# Patient Record
Sex: FEMALE | Race: WHITE
[De-identification: names, ages, dates, MRNs, and addresses within clinical notes are randomized per-mention and may not be internally consistent; named-entity substitution may affect disease eponyms.]

---

## 2017-02-23 ENCOUNTER — HOSPITAL ENCOUNTER (INPATIENT)
Dept: HOSPITAL 13 - EME | Age: 52
LOS: 5 days | Discharge: HOME | DRG: 189 | End: 2017-02-28
Attending: INTERNAL MEDICINE | Admitting: INTERNAL MEDICINE
Payer: COMMERCIAL

## 2017-02-23 VITALS — HEIGHT: 67 IN | BODY MASS INDEX: 45.99 KG/M2 | WEIGHT: 293 LBS

## 2017-02-23 DIAGNOSIS — J44.9: ICD-10-CM

## 2017-02-23 DIAGNOSIS — Z79.84: ICD-10-CM

## 2017-02-23 DIAGNOSIS — I42.9: ICD-10-CM

## 2017-02-23 DIAGNOSIS — G47.33: ICD-10-CM

## 2017-02-23 DIAGNOSIS — Z79.4: ICD-10-CM

## 2017-02-23 DIAGNOSIS — F32.9: ICD-10-CM

## 2017-02-23 DIAGNOSIS — E11.42: ICD-10-CM

## 2017-02-23 DIAGNOSIS — Z87.891: ICD-10-CM

## 2017-02-23 DIAGNOSIS — I48.2: ICD-10-CM

## 2017-02-23 DIAGNOSIS — J96.01: Primary | ICD-10-CM

## 2017-02-23 DIAGNOSIS — E78.5: ICD-10-CM

## 2017-02-23 DIAGNOSIS — I50.33: ICD-10-CM

## 2017-02-23 DIAGNOSIS — J10.1: ICD-10-CM

## 2017-02-23 DIAGNOSIS — E66.9: ICD-10-CM

## 2017-02-23 DIAGNOSIS — I95.9: ICD-10-CM

## 2017-02-23 DIAGNOSIS — R91.8: ICD-10-CM

## 2017-02-23 DIAGNOSIS — I11.0: ICD-10-CM

## 2017-02-23 LAB
ADD MIUA?: YES
ANA TITR SER IF: (no result) /HPF (ref 0–5)
ANION GAP: 9 MEQ/L (ref 2–14)
BACTERIA #/AREA URNS HPF: (no result) /HPF
BILIRUBIN: NEGATIVE
BLOOD: NEGATIVE
CARBAMAZEPINE SERPL-MCNC: < 0.01 NG/ML (ref 0–0.3)
CHLORIDE: 100 MEQ/L (ref 99–109)
COLOR UR: YELLOW
CREATINE KINASE: 54 IU/L (ref 1–294)
D DIMER PPP FEU-MCNC: NEGATIVE
EOSINOPHIL # BLD: 0.2 K/UL (ref 0–0.3)
EOSINOPHIL (%): 2.8 % (ref 0–5)
EPITHELIAL CELLS: (no result) /HPF
ETHANOL SERPL-MCNC: NEGATIVE MG/DL
GFR SERPLBLD CREATININE-BSD FMLAMALE: > 59 ML/MIN/
GLUCOSE (STRIP): NEGATIVE
GLUCOSE SERPL-MCNC: 218 MG/DL (ref 70–99)
HCO3 BLD-SCNC: 28 MEQ/L (ref 20–31)
HCT VFR BLD CALC: 42.1 % (ref 36–46)
HYALINE CASTS #/AREA URNS LPF: (no result) /LPF
IMM GRANULOCYTES # BLD: 0.1 K/UL
IMMATURE GRANULOCYTE (%): 0.1 % (ref 0–0.7)
INFLUENZA A VIRAL ANTIGEN: (no result)
INFLUENZA B VIRAL ANTIGEN: (no result)
IRON SERPL-MCNC: NEGATIVE UG/DL
LYMPHOCYTE COUNT: 1.4 K/UL (ref 1–2.8)
MCH RBC QN AUTO: 30.5 PG (ref 29–34)
MCV: 90.5 FL (ref 83–99)
MONOCYTE (%): 13.9 % (ref 3–12)
MONOCYTES # BLD MANUAL: 33.7 G/DL (ref 30–36)
MONOCYTES # BLD: 0.9 K/UL (ref 0–0.8)
MUCOUS THREADS #/AREA URNS LPF: (no result) /LPF
NEUTROPHIL (%): 61.8 % (ref 45–76)
NEUTROPHIL COUNT: 4.2 K/UL (ref 1.8–6.4)
NITRITE: NEGATIVE
PAPPENHEIMER BOD BLD QL SMEAR: 0.1 K/UL (ref 0–0.1)
PLATELET COUNT: 333 K/UL (ref 156–360)
POTASSIUM SERPL-SCNC: 4 MEQ/L (ref 3.7–5.4)
RBC # BLD AUTO: 4.65 M/UL (ref 3.8–5.2)
RBC DIS.WIDTH-CV: 13.7 % (ref 11.8–14.6)
RBC DIS.WIDTH-SD: 44.2 % (ref 39–53)
RED BLOOD CELLS: (no result) /HPF (ref 0–5)
SODIUM: 137 MEQ/L (ref 136–147)
SPECIFIC GRAVITY: 1.02 (ref 1–1.03)
UREA NITROGEN (BUN): 16 MG/DL (ref 9–23)
UROBILINOGEN UR-MCNC: 0.2 MG/DL (ref 0.2–1)
VARIANT LYMPHS NFR BLD MANUAL: 9.7 UM^3 (ref 9.5–12.4)
WBC # BLD AUTO: 6.8 K/UL (ref 4.1–10.2)
WBC # BLD AUTO: NEGATIVE 10*3/UL

## 2017-02-24 VITALS — DIASTOLIC BLOOD PRESSURE: 62 MMHG | SYSTOLIC BLOOD PRESSURE: 88 MMHG

## 2017-02-24 VITALS — SYSTOLIC BLOOD PRESSURE: 105 MMHG | DIASTOLIC BLOOD PRESSURE: 68 MMHG

## 2017-02-24 VITALS — SYSTOLIC BLOOD PRESSURE: 104 MMHG | DIASTOLIC BLOOD PRESSURE: 59 MMHG

## 2017-02-24 VITALS — DIASTOLIC BLOOD PRESSURE: 57 MMHG | SYSTOLIC BLOOD PRESSURE: 112 MMHG

## 2017-02-24 VITALS — SYSTOLIC BLOOD PRESSURE: 159 MMHG | DIASTOLIC BLOOD PRESSURE: 74 MMHG

## 2017-02-24 VITALS — SYSTOLIC BLOOD PRESSURE: 102 MMHG | DIASTOLIC BLOOD PRESSURE: 61 MMHG

## 2017-02-24 LAB
ALP SERPL-CCNC: 85 IU/L (ref 3–129)
ANION GAP: 11 MEQ/L (ref 2–14)
CARBAMAZEPINE SERPL-MCNC: < 0.01 NG/ML (ref 0–0.3)
CARBAMAZEPINE SERPL-MCNC: < 0.01 NG/ML (ref 0–0.3)
CHLORIDE: 102 MEQ/L (ref 99–109)
ETHANOL SERPL-MCNC: NEGATIVE MG/DL
ETHANOL SERPL-MCNC: NEGATIVE MG/DL
GFR SERPLBLD CREATININE-BSD FMLAMALE: > 59 ML/MIN/
GLUCOSE SERPL-MCNC: 179 MG/DL (ref 70–99)
HCO3 BLD-SCNC: 25 MEQ/L (ref 20–31)
HCT VFR BLD CALC: 40.4 % (ref 36–46)
MCH RBC QN AUTO: 30.4 PG (ref 29–34)
MCV: 90.2 FL (ref 83–99)
MONOCYTES # BLD MANUAL: 33.7 G/DL (ref 30–36)
PLATELET COUNT: 267 K/UL (ref 156–360)
POINT-OF-CARE METER ID: (no result)
POTASSIUM SERPL-SCNC: 3.7 MEQ/L (ref 3.7–5.4)
RBC # BLD AUTO: 4.48 M/UL (ref 3.8–5.2)
RBC DIS.WIDTH-CV: 13.6 % (ref 11.8–14.6)
RBC DIS.WIDTH-SD: 44 % (ref 39–53)
SODIUM: 138 MEQ/L (ref 136–147)
TOTAL BILIRUBIN: 0.4 MG/DL (ref 0–1)
UREA NITROGEN (BUN): 17 MG/DL (ref 9–23)
VARIANT LYMPHS NFR BLD MANUAL: 10.1 UM^3 (ref 9.5–12.4)
WBC # BLD AUTO: 6.4 K/UL (ref 4.1–10.2)

## 2017-02-25 VITALS — SYSTOLIC BLOOD PRESSURE: 119 MMHG | DIASTOLIC BLOOD PRESSURE: 58 MMHG

## 2017-02-25 VITALS — DIASTOLIC BLOOD PRESSURE: 62 MMHG | SYSTOLIC BLOOD PRESSURE: 100 MMHG

## 2017-02-25 VITALS — DIASTOLIC BLOOD PRESSURE: 60 MMHG | SYSTOLIC BLOOD PRESSURE: 141 MMHG

## 2017-02-25 VITALS — SYSTOLIC BLOOD PRESSURE: 98 MMHG | DIASTOLIC BLOOD PRESSURE: 60 MMHG

## 2017-02-25 VITALS — DIASTOLIC BLOOD PRESSURE: 64 MMHG | SYSTOLIC BLOOD PRESSURE: 118 MMHG

## 2017-02-25 VITALS — DIASTOLIC BLOOD PRESSURE: 52 MMHG | SYSTOLIC BLOOD PRESSURE: 98 MMHG

## 2017-02-25 LAB
ANION GAP: 9 MEQ/L (ref 2–14)
CHLORIDE: 102 MEQ/L (ref 99–109)
EOSINOPHIL # BLD: 0.5 K/UL (ref 0–0.3)
EOSINOPHIL (%): 8.2 % (ref 0–5)
GFR SERPLBLD CREATININE-BSD FMLAMALE: > 59 ML/MIN/
GLUCOSE SERPL-MCNC: 149 MG/DL (ref 70–99)
HCO3 BLD-SCNC: 30 MEQ/L (ref 20–31)
HCT VFR BLD CALC: 41.6 % (ref 36–46)
IMM GRANULOCYTES # BLD: 0 K/UL
IMMATURE GRANULOCYTE (%): 0.2 % (ref 0–0.7)
INFLUENZA A VIRAL ANTIGEN: NEGATIVE
INFLUENZA B VIRAL ANTIGEN: POSITIVE
LYMPHOCYTE COUNT: 1.5 K/UL (ref 1–2.8)
MCH RBC QN AUTO: 31 PG (ref 29–34)
MCV: 92.7 FL (ref 83–99)
MONOCYTE (%): 11.4 % (ref 3–12)
MONOCYTES # BLD MANUAL: 33.4 G/DL (ref 30–36)
MONOCYTES # BLD: 0.6 K/UL (ref 0–0.8)
NEUTROPHIL (%): 52.7 % (ref 45–76)
NEUTROPHIL COUNT: 3 K/UL (ref 1.8–6.4)
NRBC (%): 0 /100 WBC (ref 0–0)
PAPPENHEIMER BOD BLD QL SMEAR: 0 K/UL (ref 0–0.1)
PLATELET COUNT: 264 K/UL (ref 156–360)
POINT-OF-CARE METER ID: (no result)
POTASSIUM SERPL-SCNC: 4.2 MEQ/L (ref 3.7–5.4)
RBC # BLD AUTO: 4.49 M/UL (ref 3.8–5.2)
RBC DIS.WIDTH-CV: 13.8 % (ref 11.8–14.6)
RBC DIS.WIDTH-SD: 46.3 % (ref 39–53)
SAMPLE HEMOLYSIS CHECK: 0
SAMPLE ICTERIC CHECK: 0
SODIUM: 141 MEQ/L (ref 136–147)
SPECIMEN VOL UR: 0 ML
UREA NITROGEN (BUN): 17 MG/DL (ref 9–23)
VARIANT LYMPHS NFR BLD MANUAL: 10.2 UM^3 (ref 9.5–12.4)
WBC # BLD AUTO: 5.6 K/UL (ref 4.1–10.2)

## 2017-02-26 VITALS — SYSTOLIC BLOOD PRESSURE: 122 MMHG | DIASTOLIC BLOOD PRESSURE: 66 MMHG

## 2017-02-26 VITALS — DIASTOLIC BLOOD PRESSURE: 59 MMHG | SYSTOLIC BLOOD PRESSURE: 110 MMHG

## 2017-02-26 VITALS — DIASTOLIC BLOOD PRESSURE: 60 MMHG | SYSTOLIC BLOOD PRESSURE: 110 MMHG

## 2017-02-26 VITALS — SYSTOLIC BLOOD PRESSURE: 112 MMHG | DIASTOLIC BLOOD PRESSURE: 60 MMHG

## 2017-02-26 VITALS — DIASTOLIC BLOOD PRESSURE: 62 MMHG | SYSTOLIC BLOOD PRESSURE: 106 MMHG

## 2017-02-26 VITALS — SYSTOLIC BLOOD PRESSURE: 120 MMHG | DIASTOLIC BLOOD PRESSURE: 68 MMHG

## 2017-02-26 LAB
25(OH)D3 SERPL-MCNC: 1.7 MG/DL (ref 1.3–2.7)
ANION GAP: 7 MEQ/L (ref 2–14)
CHLORIDE: 102 MEQ/L (ref 99–109)
GFR SERPLBLD CREATININE-BSD FMLAMALE: > 59 ML/MIN/
GLUCOSE SERPL-MCNC: 265 MG/DL (ref 70–99)
HCO3 BLD-SCNC: 30 MEQ/L (ref 20–31)
HCT VFR BLD CALC: 41.6 % (ref 36–46)
MCH RBC QN AUTO: 29.8 PG (ref 29–34)
MCV: 91.8 FL (ref 83–99)
MONOCYTES # BLD MANUAL: 32.5 G/DL (ref 30–36)
PLATELET COUNT: 270 K/UL (ref 156–360)
POINT-OF-CARE METER ID: (no result)
POTASSIUM SERPL-SCNC: 4.1 MEQ/L (ref 3.7–5.4)
RBC # BLD AUTO: 4.53 M/UL (ref 3.8–5.2)
RBC DIS.WIDTH-CV: 13.6 % (ref 11.8–14.6)
RBC DIS.WIDTH-SD: 45.2 % (ref 39–53)
SAMPLE HEMOLYSIS CHECK: 0
SAMPLE ICTERIC CHECK: 0
SODIUM: 139 MEQ/L (ref 136–147)
SPECIMEN VOL UR: 0 ML
UREA NITROGEN (BUN): 20 MG/DL (ref 9–23)
VARIANT LYMPHS NFR BLD MANUAL: 10.2 UM^3 (ref 9.5–12.4)
WBC # BLD AUTO: 4.6 K/UL (ref 4.1–10.2)

## 2017-02-27 VITALS — SYSTOLIC BLOOD PRESSURE: 117 MMHG | DIASTOLIC BLOOD PRESSURE: 72 MMHG

## 2017-02-27 VITALS — SYSTOLIC BLOOD PRESSURE: 93 MMHG | DIASTOLIC BLOOD PRESSURE: 54 MMHG

## 2017-02-27 VITALS — SYSTOLIC BLOOD PRESSURE: 118 MMHG | DIASTOLIC BLOOD PRESSURE: 53 MMHG

## 2017-02-27 VITALS — SYSTOLIC BLOOD PRESSURE: 111 MMHG | DIASTOLIC BLOOD PRESSURE: 55 MMHG

## 2017-02-27 VITALS — DIASTOLIC BLOOD PRESSURE: 82 MMHG | SYSTOLIC BLOOD PRESSURE: 124 MMHG

## 2017-02-27 VITALS — DIASTOLIC BLOOD PRESSURE: 71 MMHG | SYSTOLIC BLOOD PRESSURE: 135 MMHG

## 2017-02-27 VITALS — DIASTOLIC BLOOD PRESSURE: 62 MMHG | SYSTOLIC BLOOD PRESSURE: 117 MMHG

## 2017-02-27 LAB
ANION GAP: 9 MEQ/L (ref 2–14)
CHLORIDE: 103 MEQ/L (ref 99–109)
GFR SERPLBLD CREATININE-BSD FMLAMALE: > 59 ML/MIN/
GLUCOSE SERPL-MCNC: 147 MG/DL (ref 70–99)
HCO3 BLD-SCNC: 32 MEQ/L (ref 20–31)
POINT-OF-CARE METER ID: (no result)
POTASSIUM SERPL-SCNC: 3.3 MEQ/L (ref 3.7–5.4)
SAMPLE HEMOLYSIS CHECK: 0
SAMPLE ICTERIC CHECK: 0
SODIUM: 144 MEQ/L (ref 136–147)
SPECIMEN VOL UR: 0 ML
UREA NITROGEN (BUN): 21 MG/DL (ref 9–23)

## 2017-02-28 VITALS — DIASTOLIC BLOOD PRESSURE: 68 MMHG | SYSTOLIC BLOOD PRESSURE: 119 MMHG

## 2017-02-28 VITALS — SYSTOLIC BLOOD PRESSURE: 121 MMHG | DIASTOLIC BLOOD PRESSURE: 78 MMHG

## 2017-02-28 VITALS — DIASTOLIC BLOOD PRESSURE: 66 MMHG | SYSTOLIC BLOOD PRESSURE: 125 MMHG

## 2017-02-28 LAB
POINT-OF-CARE METER ID: (no result)
POINT-OF-CARE METER ID: (no result)

## 2019-05-28 ENCOUNTER — APPOINTMENT (OUTPATIENT)
Age: 54
Setting detail: DERMATOLOGY
End: 2019-05-28

## 2019-05-28 DIAGNOSIS — B35.4 TINEA CORPORIS: ICD-10-CM

## 2019-05-28 DIAGNOSIS — L0391 CELLULITIS AND ABSCESS OF UNSPECIFIED SITES: ICD-10-CM

## 2019-05-28 DIAGNOSIS — L0390 CELLULITIS AND ABSCESS OF UNSPECIFIED SITES: ICD-10-CM

## 2019-05-28 PROBLEM — L02.212 CUTANEOUS ABSCESS OF BACK [ANY PART, EXCEPT BUTTOCK]: Status: ACTIVE | Noted: 2019-05-28

## 2019-05-28 PROCEDURE — OTHER PRESCRIPTION: OTHER

## 2019-05-28 PROCEDURE — OTHER MIPS QUALITY: OTHER

## 2019-05-28 PROCEDURE — 99202 OFFICE O/P NEW SF 15 MIN: CPT

## 2019-05-28 RX ORDER — KETOCONAZOLE 20 MG/G
2% CREAM TOPICAL BID
Qty: 1 | Refills: 3 | Status: ERX | COMMUNITY
Start: 2019-05-28

## 2019-05-28 RX ORDER — CLARITHROMYCIN 500 MG/1
1 TABLET, COATED ORAL BID
Qty: 20 | Refills: 1 | Status: ERX | COMMUNITY
Start: 2019-05-28

## 2019-05-28 ASSESSMENT — LOCATION ZONE DERM
LOCATION ZONE: LEG
LOCATION ZONE: TRUNK

## 2019-05-28 ASSESSMENT — LOCATION DETAILED DESCRIPTION DERM
LOCATION DETAILED: RIGHT POPLITEAL SKIN
LOCATION DETAILED: SUPERIOR THORACIC SPINE

## 2019-05-28 ASSESSMENT — LOCATION SIMPLE DESCRIPTION DERM
LOCATION SIMPLE: UPPER BACK
LOCATION SIMPLE: RIGHT POPLITEAL SKIN

## 2019-05-28 NOTE — PROCEDURE: MIPS QUALITY
Detail Level: Detailed
Quality 110: Preventive Care And Screening: Influenza Immunization: Influenza Immunization Administered during Influenza season
Quality 402: Tobacco Use And Help With Quitting Among Adolescents: Patient screened for tobacco and is an ex-smoker
Quality 111:Pneumonia Vaccination Status For Older Adults: Pneumococcal Vaccination not Administered or Previously Received, Reason not Otherwise Specified
Quality 130: Documentation Of Current Medications In The Medical Record: Current Medications Documented
Quality 431: Preventive Care And Screening: Unhealthy Alcohol Use - Screening: Documentation of medical reason(s) for not screening for unhealthy alcohol use (eg, limited life expectancy, other medical reasons)
Quality 131: Pain Assessment And Follow-Up: Pain assessment documented as positive using a standardized tool AND a follow-up plan is documented
Quality 226: Preventive Care And Screening: Tobacco Use: Screening And Cessation Intervention: Patient screened for tobacco use and is an ex/non-smoker

## 2020-11-18 NOTE — HPI: SKIN LESIONS
----- Message from Cheryle Quintana sent at 11/18/2020 11:52 AM CST -----  Called pt to schedule with Dr Norwood.     
How Severe Is Your Skin Lesion?: mild
Have Your Skin Lesions Been Treated?: not been treated
Is This A New Presentation, Or A Follow-Up?: Skin Lesions

## 2022-11-02 ENCOUNTER — APPOINTMENT (OUTPATIENT)
Age: 57
Setting detail: DERMATOLOGY
End: 2022-11-02

## 2022-11-02 DIAGNOSIS — B35.1 TINEA UNGUIUM: ICD-10-CM

## 2022-11-02 DIAGNOSIS — L21.8 OTHER SEBORRHEIC DERMATITIS: ICD-10-CM

## 2022-11-02 DIAGNOSIS — L71.8 OTHER ROSACEA: ICD-10-CM

## 2022-11-02 PROCEDURE — 99204 OFFICE O/P NEW MOD 45 MIN: CPT

## 2022-11-02 PROCEDURE — OTHER PRESCRIPTION: OTHER

## 2022-11-02 PROCEDURE — OTHER MIPS QUALITY: OTHER

## 2022-11-02 PROCEDURE — OTHER COUNSELING: OTHER

## 2022-11-02 RX ORDER — METRONIDAZOLE 7.5 MG/G
0.75% GEL TOPICAL QD
Qty: 45 | Refills: 2 | Status: ERX | COMMUNITY
Start: 2022-11-02

## 2022-11-02 RX ORDER — KETOCONAZOLE 20 MG/G
2% CREAM TOPICAL BID
Qty: 15 | Refills: 1 | Status: ERX | COMMUNITY
Start: 2022-11-02

## 2022-11-02 RX ORDER — CICLOPIROX 80 MG/ML
8% SOLUTION TOPICAL QHS
Qty: 6.6 | Refills: 3 | Status: ERX | COMMUNITY
Start: 2022-11-02

## 2022-11-02 ASSESSMENT — LOCATION SIMPLE DESCRIPTION DERM
LOCATION SIMPLE: RIGHT CHEEK
LOCATION SIMPLE: LEFT CHEEK
LOCATION SIMPLE: RIGHT INDEX FINGERNAIL
LOCATION SIMPLE: GLABELLA
LOCATION SIMPLE: RIGHT INDEX FINGERNAIL
LOCATION SIMPLE: LEFT CHEEK
LOCATION SIMPLE: LEFT THUMBNAIL
LOCATION SIMPLE: LEFT FOREHEAD
LOCATION SIMPLE: LEFT FOREHEAD
LOCATION SIMPLE: INFERIOR FOREHEAD
LOCATION SIMPLE: RIGHT CHEEK
LOCATION SIMPLE: LEFT THUMBNAIL

## 2022-11-02 ASSESSMENT — LOCATION ZONE DERM
LOCATION ZONE: FACE
LOCATION ZONE: FACE
LOCATION ZONE: FINGERNAIL
LOCATION ZONE: FINGERNAIL

## 2022-11-02 ASSESSMENT — LOCATION DETAILED DESCRIPTION DERM
LOCATION DETAILED: LEFT INFERIOR MEDIAL MALAR CHEEK
LOCATION DETAILED: RIGHT INFERIOR CENTRAL MALAR CHEEK
LOCATION DETAILED: LEFT INFERIOR MEDIAL MALAR CHEEK
LOCATION DETAILED: LEFT INFERIOR MEDIAL FOREHEAD
LOCATION DETAILED: GLABELLA
LOCATION DETAILED: LEFT THUMBNAIL
LOCATION DETAILED: RIGHT INFERIOR CENTRAL MALAR CHEEK
LOCATION DETAILED: INFERIOR MID FOREHEAD
LOCATION DETAILED: LEFT INFERIOR MEDIAL FOREHEAD
LOCATION DETAILED: LEFT THUMBNAIL
LOCATION DETAILED: RIGHT INDEX FINGERNAIL
LOCATION DETAILED: RIGHT INDEX FINGERNAIL

## 2022-11-02 NOTE — PROCEDURE: MIPS QUALITY
Quality 226: Preventive Care And Screening: Tobacco Use: Screening And Cessation Intervention: Patient screened for tobacco use and is an ex/non-smoker
Quality 110: Preventive Care And Screening: Influenza Immunization: Influenza Immunization Administered during Influenza season
Quality 111:Pneumonia Vaccination Status For Older Adults: Pneumococcal Vaccination not Administered or Previously Received, Reason not Otherwise Specified
Quality 402: Tobacco Use And Help With Quitting Among Adolescents: Patient screened for tobacco and is an ex-smoker
Detail Level: Detailed
Quality 431: Preventive Care And Screening: Unhealthy Alcohol Use - Screening: Documentation of medical reason(s) for not screening for unhealthy alcohol use (eg, limited life expectancy, other medical reasons)
Quality 130: Documentation Of Current Medications In The Medical Record: Current Medications Documented
Quality 131: Pain Assessment And Follow-Up: Pain assessment documented as positive using a standardized tool AND a follow-up plan is documented

## 2023-03-14 ENCOUNTER — APPOINTMENT (OUTPATIENT)
Dept: URBAN - METROPOLITAN AREA SURGERY 23 | Age: 58
Setting detail: DERMATOLOGY
End: 2023-03-15

## 2023-03-14 DIAGNOSIS — L71.8 OTHER ROSACEA: ICD-10-CM

## 2023-03-14 DIAGNOSIS — L60.3 NAIL DYSTROPHY: ICD-10-CM

## 2023-03-14 PROCEDURE — OTHER MIPS QUALITY: OTHER

## 2023-03-14 PROCEDURE — OTHER NAIL CLIPPING FOR PAS: OTHER

## 2023-03-14 PROCEDURE — OTHER PRESCRIPTION: OTHER

## 2023-03-14 PROCEDURE — 99213 OFFICE O/P EST LOW 20 MIN: CPT

## 2023-03-14 PROCEDURE — OTHER ORDER TESTS: OTHER

## 2023-03-14 PROCEDURE — OTHER PRESCRIPTION MEDICATION MANAGEMENT: OTHER

## 2023-03-14 PROCEDURE — OTHER COUNSELING: OTHER

## 2023-03-14 RX ORDER — AZELAIC ACID 0.15 G/G
15% AEROSOL, FOAM TOPICAL QHS
Qty: 1 | Refills: 2 | Status: ERX | COMMUNITY
Start: 2023-03-14

## 2023-03-14 RX ORDER — DOXYCYCLINE HYCLATE 50 MG/1
1 TABLET, FILM COATED ORAL QD
Qty: 30 | Refills: 2 | Status: ERX | COMMUNITY
Start: 2023-03-14

## 2023-03-14 ASSESSMENT — LOCATION ZONE DERM
LOCATION ZONE: FACE
LOCATION ZONE: FACE
LOCATION ZONE: FINGERNAIL
LOCATION ZONE: FINGERNAIL

## 2023-03-14 ASSESSMENT — LOCATION SIMPLE DESCRIPTION DERM
LOCATION SIMPLE: RIGHT CHEEK
LOCATION SIMPLE: RIGHT CHEEK
LOCATION SIMPLE: LEFT INDEX FINGERNAIL
LOCATION SIMPLE: LEFT CHEEK
LOCATION SIMPLE: INFERIOR FOREHEAD
LOCATION SIMPLE: GLABELLA
LOCATION SIMPLE: LEFT INDEX FINGERNAIL
LOCATION SIMPLE: LEFT CHEEK

## 2023-03-14 ASSESSMENT — LOCATION DETAILED DESCRIPTION DERM
LOCATION DETAILED: LEFT INFERIOR MEDIAL MALAR CHEEK
LOCATION DETAILED: GLABELLA
LOCATION DETAILED: LEFT INFERIOR MEDIAL MALAR CHEEK
LOCATION DETAILED: RIGHT INFERIOR CENTRAL MALAR CHEEK
LOCATION DETAILED: LEFT INDEX FINGERNAIL
LOCATION DETAILED: LEFT INDEX FINGERNAIL
LOCATION DETAILED: LEFT INFERIOR CENTRAL MALAR CHEEK
LOCATION DETAILED: INFERIOR MID FOREHEAD
LOCATION DETAILED: RIGHT INFERIOR CENTRAL MALAR CHEEK

## 2023-03-14 NOTE — PROCEDURE: PRESCRIPTION MEDICATION MANAGEMENT
Initiate Treatment: Azelaic acid and doxycycline
Continue Regimen: Metronidazole
Detail Level: Zone
Render In Strict Bullet Format?: No

## 2023-03-14 NOTE — PROCEDURE: MIPS QUALITY
Quality 131: Pain Assessment And Follow-Up: Pain assessment documented as positive using a standardized tool AND a follow-up plan is documented
Quality 111:Pneumonia Vaccination Status For Older Adults: Pneumococcal Vaccination not Administered or Previously Received, Reason not Otherwise Specified
Quality 402: Tobacco Use And Help With Quitting Among Adolescents: Patient screened for tobacco and is an ex-smoker
Quality 130: Documentation Of Current Medications In The Medical Record: Current Medications Documented
Detail Level: Detailed
Quality 431: Preventive Care And Screening: Unhealthy Alcohol Use - Screening: Documentation of medical reason(s) for not screening for unhealthy alcohol use (eg, limited life expectancy, other medical reasons)
Quality 110: Preventive Care And Screening: Influenza Immunization: Influenza Immunization Administered during Influenza season
Quality 226: Preventive Care And Screening: Tobacco Use: Screening And Cessation Intervention: Patient screened for tobacco use and is an ex/non-smoker

## 2023-03-28 ENCOUNTER — RX ONLY (RX ONLY)
Age: 58
End: 2023-03-28

## 2023-03-28 RX ORDER — CICLOPIROX 80 MG/ML
8% SOLUTION TOPICAL QHS
Qty: 6.6 | Refills: 2 | Status: ERX